# Patient Record
Sex: MALE | Race: OTHER | HISPANIC OR LATINO | URBAN - METROPOLITAN AREA
[De-identification: names, ages, dates, MRNs, and addresses within clinical notes are randomized per-mention and may not be internally consistent; named-entity substitution may affect disease eponyms.]

---

## 2021-11-03 ENCOUNTER — EMERGENCY (EMERGENCY)
Facility: HOSPITAL | Age: 66
LOS: 1 days | Discharge: HOME | End: 2021-11-03
Attending: EMERGENCY MEDICINE | Admitting: EMERGENCY MEDICINE
Payer: MEDICARE

## 2021-11-03 VITALS
TEMPERATURE: 97 F | DIASTOLIC BLOOD PRESSURE: 79 MMHG | RESPIRATION RATE: 16 BRPM | OXYGEN SATURATION: 98 % | HEART RATE: 71 BPM | SYSTOLIC BLOOD PRESSURE: 129 MMHG

## 2021-11-03 LAB
ALBUMIN SERPL ELPH-MCNC: 4.2 G/DL — SIGNIFICANT CHANGE UP (ref 3.3–5)
ALP SERPL-CCNC: 57 U/L — SIGNIFICANT CHANGE UP (ref 40–120)
ALT FLD-CCNC: 24 U/L — SIGNIFICANT CHANGE UP (ref 4–41)
ANION GAP SERPL CALC-SCNC: 10 MMOL/L — SIGNIFICANT CHANGE UP (ref 7–14)
APTT BLD: 28 SEC — SIGNIFICANT CHANGE UP (ref 27–36.3)
AST SERPL-CCNC: 24 U/L — SIGNIFICANT CHANGE UP (ref 4–40)
BILIRUB SERPL-MCNC: 0.7 MG/DL — SIGNIFICANT CHANGE UP (ref 0.2–1.2)
BUN SERPL-MCNC: 20 MG/DL — SIGNIFICANT CHANGE UP (ref 7–23)
CALCIUM SERPL-MCNC: 9.1 MG/DL — SIGNIFICANT CHANGE UP (ref 8.4–10.5)
CHLORIDE SERPL-SCNC: 112 MMOL/L — HIGH (ref 98–107)
CO2 SERPL-SCNC: 29 MMOL/L — SIGNIFICANT CHANGE UP (ref 22–31)
CREAT SERPL-MCNC: 1.09 MG/DL — SIGNIFICANT CHANGE UP (ref 0.5–1.3)
GLUCOSE SERPL-MCNC: 108 MG/DL — HIGH (ref 70–99)
HCT VFR BLD CALC: 38.1 % — LOW (ref 39–50)
HGB BLD-MCNC: 12.7 G/DL — LOW (ref 13–17)
INR BLD: 1.18 RATIO — HIGH (ref 0.88–1.16)
MAGNESIUM SERPL-MCNC: 2.4 MG/DL — SIGNIFICANT CHANGE UP (ref 1.6–2.6)
MCHC RBC-ENTMCNC: 31.8 PG — SIGNIFICANT CHANGE UP (ref 27–34)
MCHC RBC-ENTMCNC: 33.3 GM/DL — SIGNIFICANT CHANGE UP (ref 32–36)
MCV RBC AUTO: 95.3 FL — SIGNIFICANT CHANGE UP (ref 80–100)
NRBC # BLD: 0 /100 WBCS — SIGNIFICANT CHANGE UP
NRBC # FLD: 0 K/UL — SIGNIFICANT CHANGE UP
PHOSPHATE SERPL-MCNC: 2.4 MG/DL — LOW (ref 2.5–4.5)
PLATELET # BLD AUTO: 175 K/UL — SIGNIFICANT CHANGE UP (ref 150–400)
POTASSIUM SERPL-MCNC: 3.6 MMOL/L — SIGNIFICANT CHANGE UP (ref 3.5–5.3)
POTASSIUM SERPL-SCNC: 3.6 MMOL/L — SIGNIFICANT CHANGE UP (ref 3.5–5.3)
PROT SERPL-MCNC: 6.7 G/DL — SIGNIFICANT CHANGE UP (ref 6–8.3)
PROTHROM AB SERPL-ACNC: 13.5 SEC — SIGNIFICANT CHANGE UP (ref 10.6–13.6)
RBC # BLD: 4 M/UL — LOW (ref 4.2–5.8)
RBC # FLD: 13.5 % — SIGNIFICANT CHANGE UP (ref 10.3–14.5)
SODIUM SERPL-SCNC: 151 MMOL/L — HIGH (ref 135–145)
WBC # BLD: 7.45 K/UL — SIGNIFICANT CHANGE UP (ref 3.8–10.5)
WBC # FLD AUTO: 7.45 K/UL — SIGNIFICANT CHANGE UP (ref 3.8–10.5)

## 2021-11-03 PROCEDURE — 99284 EMERGENCY DEPT VISIT MOD MDM: CPT

## 2021-11-03 NOTE — ED ADULT NURSE NOTE - CHIEF COMPLAINT QUOTE
Per pt's daughter pt has been in [facility] since Monday for rectal bleeding, rec'd 2 units of blood.  Pt was prepped for colonoscopy that was scheduled for noon, daughter states that it didn't happen so she signed him out AMA and came to ED.  PMH: HTN, HLD, DM2

## 2021-11-03 NOTE — ED PROVIDER NOTE - PATIENT PORTAL LINK FT
You can access the FollowMyHealth Patient Portal offered by [facility] by registering at the following website: [email or web address]. By joining [employer]’s FollowMyHealth portal, you will also be able to view your health information using other applications (apps) compatible with our system.

## 2021-11-03 NOTE — ED PROVIDER NOTE - NSFOLLOWUPCLINICS_GEN_ALL_ED_FT
[facility] [place]  Gastroenterology  [address] [place] [address]  Phone: [phone]  Fax:

## 2021-11-03 NOTE — ED PROVIDER NOTE - PHYSICAL EXAMINATION
VITALS:   T(C): 36.8 (11-03-21 @ 21:08), Max: 36.8 (11-03-21 @ 21:08)  HR: 74 (11-03-21 @ 21:08) (71 - 74)  BP: 142/70 (11-03-21 @ 21:08) (129/79 - 142/70)  RR: 18 (11-03-21 @ 21:08) (16 - 18)  SpO2: 98% (11-03-21 @ 21:08) (98% - 98%)    PHYSICAL EXAM:   General: NAD; awake, lying in bed comfortably  HEENT: nc/at, clear conjunctiva, moist mucous membranes  Neck: supple, no JVD  Heart: RRR; no murmurs, rubs, or gallops  Chest/Lung: clear to auscultation bilaterally; no wheezes, rales, or rhonchi  Abdomen: soft, non-tender, non-distended; no guarding or rebound; bowel sounds present  Rectal: no signs of bleeding  Extremities: no edema, erythema, or tenderness to palpation  Skin: clear, dry  Neuro: A&Ox3; no focal deficits, grossly intact VITALS:   T(C): 36.8 (11-03-21 @ 21:08), Max: 36.8 (11-03-21 @ 21:08)  HR: 74 (11-03-21 @ 21:08) (71 - 74)  BP: 142/70 (11-03-21 @ 21:08) (129/79 - 142/70)  RR: 18 (11-03-21 @ 21:08) (16 - 18)  SpO2: 98% (11-03-21 @ 21:08) (98% - 98%)    PHYSICAL EXAM:   General: NAD; awake, lying in bed comfortably  HEENT: nc/at, clear conjunctiva, moist mucous membranes  Neck: supple, no JVD  Heart: RRR; no murmurs, rubs, or gallops  Chest/Lung: clear to auscultation bilaterally; no wheezes, rales, or rhonchi  Abdomen: soft, non-tender, non-distended; no guarding or rebound; bowel sounds present  Rectal: no signs of bleeding  Extremities: no edema, erythema, or tenderness to palpation  Skin: clear, dry  Neuro: A&Ox3; no focal deficits, grossly intact  ATTENDING PHYSICAL EXAM  GEN - NAD; well appearing; A+O x3  HEAD - NC/AT; EYES/NOSE - PERRL, EOMI, no pale conj, mucous membranes moist, no discharge; THROAT: Oral cavity and pharynx normal. No inflammation, swelling, exudate, or lesions  NECK: Neck supple, non-tender without lymphadenopathy, no masses, no JVD  PULMONARY - CTA b/l, symmetric breath sounds, no w/r/r  CARDIAC -s1s2, RRR, no M,R,G  ABDOMEN - +NABS, ND, NT, soft, no guarding, no rebound, no masses   BACK - no CVA tenderness, No vertebral or paravertebral tenderness  EXTREMITIES - symmetric pulses, 2+ dp, capillary refill < 2 seconds, no clubbing, no cyanosis, no edema

## 2021-11-03 NOTE — ED PROVIDER NOTE - CLINICAL SUMMARY MEDICAL DECISION MAKING FREE TEXT BOX
Ordered labs - CBC, CMP, coags; type and screen. Possible admission given previous admission to OSH and leaving AMA w/o colonoscopy i/s/o GIB. Ordered labs - CBC, CMP, coags; type and screen. May require admission if anemic or evidence ongoing GIB.

## 2021-11-03 NOTE — ED ADULT TRIAGE NOTE - CHIEF COMPLAINT QUOTE
Per pt's daughter pt has been in [facility] since Monday for rectal bleeding, rec'd 2 units of blood.  Pt was prepped for colonoscopy that was scheduled for noon, daughter states that it didn't happen so she signed him out AMA and came to ED.  PMH: HTN, HLD, DM2

## 2021-11-03 NOTE — ED ADULT NURSE NOTE - NSIMPLEMENTINTERV_GEN_ALL_ED
Implemented All Fall with Harm Risk Interventions:  [place] to call system. Call bell, personal items and telephone within reach. Instruct patient to call for assistance. Room bathroom lighting operational. Non-slip footwear when patient is off stretcher. Physically safe environment: no spills, clutter or unnecessary equipment. Stretcher in lowest position, wheels locked, appropriate side rails in place. Provide visual cue, wrist band, yellow gown, etc. Monitor gait and stability. Monitor for mental status changes and reorient to person, place, and time. Review medications for side effects contributing to fall risk. Reinforce activity limits and safety measures with patient and family. Provide visual clues: red socks.

## 2021-11-03 NOTE — ED PROVIDER NOTE - PROGRESS NOTE DETAILS
FOBT negative. Hemoglobin stable at 12.7. Workup unremarkable. Patient will be discharged with outpatient GI follow-up. FOBT negative. Hemoglobin and hematocrit stable at 12.7 and 38.1, respectively. Workup unremarkable. Patient will be discharged with outpatient GI follow-up.

## 2021-11-03 NOTE — ED PROVIDER NOTE - OBJECTIVE STATEMENT
The patient is a 66 year old male w/ PMHx of HTN, HLD, DM2 p/w rectal bleeding. The patient was admitted to [facility] for rectal bleeding 2 days ago when he was found to have black stool at home. He received 2 units of blood during his hospitalization there, and was prepped for a colonoscopy that was scheduled for noon today. However, when the planned colonoscopy did not occur, the patient's daughter signed the patient out AMA and brought him to this ED. Currently, the patient reports feeling well and has no complaints. He denies any abdominal pain, nausea/vomiting, diarrhea, constipation, chest pain, SOB, fever, headache, dizziness, or weakness. The patient is a 66 year old male w/ PMHx of HTN, HLD, DM2 p/w rectal bleeding. The patient was admitted to [facility] for rectal bleeding 2 days ago when he was found to have black stool at home. He received 2 units of blood during his hospitalization there, and was prepped for a colonoscopy that was scheduled for noon today. However, when the planned colonoscopy did not occur, the patient's daughter signed the patient out AMA and brought him to this ED. Currently, the patient reports feeling well and has no complaints. He denies any abdominal pain, nausea/vomiting, diarrhea, constipation, chest pain, SOB, fever, headache, dizziness, or weakness.  Attending - Agree with above.  I evaluated patient myself. 67 y/o M speaks Macanese, hx with daughter on phone as .  Offered  phone.  Reports on Monday he had episode of dark black stool associated with lightheadedness - lied on floor.  Brought by ambulance to [facility].  Daughter reports that he received 2 units blood transfusion.  Had prep and scheduled to have colonoscopy today per daughter, but when she went there, was not done.  He did not want to stay there so left and brought to [facility] ER for further evaluation.  Patient reports feeling well now.  Denies any abd pain.  No lightheadedness.  Reports no stool besides passing watery feces from bowel prep.  No BRBPR.  No CP/SOB.  No cough, fever, current covid.

## 2021-11-03 NOTE — ED ADULT NURSE NOTE - OBJECTIVE STATEMENT
Patient alert and oriented x4. PMH HTN HLD DM2. Patient recently admitted to [facility] for GIB, patient however left AMA r/t delay in diagnostic exams. Patient's daughter brought patient into ED for further eval.  Labs drawn and send  IVL in place   Assessment ongoing   Results and Dispo pending

## 2021-11-04 VITALS
DIASTOLIC BLOOD PRESSURE: 84 MMHG | RESPIRATION RATE: 16 BRPM | HEART RATE: 69 BPM | SYSTOLIC BLOOD PRESSURE: 134 MMHG | TEMPERATURE: 98 F | OXYGEN SATURATION: 100 %

## 2021-11-04 LAB
BLD GP AB SCN SERPL QL: NEGATIVE — SIGNIFICANT CHANGE UP
OB PNL STL: NEGATIVE — SIGNIFICANT CHANGE UP
RH IG SCN BLD-IMP: POSITIVE — SIGNIFICANT CHANGE UP

## 2021-11-04 NOTE — ED ADULT NURSE REASSESSMENT NOTE - NS ED NURSE REASSESS COMMENT FT1
c/o rectal bleeding. alert and oriented x 4. ambulated. denies abd discomfort and rectal bleeding today. provided oral fluid after MD Hernandez made aware.

## 2022-05-03 NOTE — ED ADULT NURSE NOTE - PRO INTERPRETER NEED 2
Anesthesia Pre Eval Note    Anesthesia ROS/Med Hx    Overall Review:  EKG was reviewed and Echo was reviewed     Anesthetic Complication History:  Patient does not have a history of anesthetic complications      Pulmonary Review:    Positive for COPD     Neuro/Psych Review:    Positive for psychiatric history    Cardiovascular Review:    Positive for CAD  Positive for CABG/stent  Positive for dysrhythmias (Aflutter)  Positive for hypertension  Positive for hyperlipidemia    GI/HEPATIC/RENAL Review:    Positive for renal disease    End/Other Review:    Positive for chronic pain  Additional Results:  EKG:  Encounter Date: 03/20/22  -Electrocardiogram 12-Lead:        Result                      Value                           Ventricular Rate EKG/M*     110                             Atrial Rate (BPM)           250                             QRS-Interval (MSEC)         130                             QT-Interval (MSEC)          362                             QTc                         490                             R Axis (Degrees)            58                              T Axis (Degrees)            44                              REPORT TEXT                                             Atrial flutter   with variable AV block   Right bundle branch block   T wave abnormality, consider lateral ischemia   Abnormal ECG   No previous ECGs available   Confirmed by OMER NGUYEN MD ([id number]) on 3/20/2022 12:21:05 PM    Echo:  Ejection Fraction       Date                     Value               Ref Range           Status                03/22/2022               55%                                     Corrected        ----------   ALLERGIES:  No Known Allergies       Last Labs        Component                Value               Date/Time                  WBC                      6.8                 04/19/2022 1012            RBC                      4.58                04/19/2022 1012            HGB                      12.9                 04/19/2022 1012            HCT                      40.9                04/19/2022 1012            MCV                      89.3                04/19/2022 1012            MCH                      28.2                04/19/2022 1012            MCHC                     31.5 (L)            04/19/2022 1012            RDW-CV                   14.6                04/19/2022 1012            Sodium                   142                 04/19/2022 1012            Potassium                4.3                 04/19/2022 1012            Chloride                 109 (H)             04/19/2022 1012            Carbon Dioxide           25                  04/19/2022 1012            Glucose                  76                  04/19/2022 1012            BUN                      25 (H)              04/19/2022 1012            Creatinine               1.32 (H)            04/19/2022 1012            Glomerular Filtrati*     40 (L)              04/19/2022 1012            Calcium                  9.4                 04/19/2022 1012            PLT                      249                 04/19/2022 1012            INR                      1.1                 03/20/2022 1234        Past Medical History:  No date: Anxiety  No date: Atrial flutter (CMS/[facility])  No date: CAD (coronary artery disease)  No date: CKD (chronic kidney disease)  1/22/2019: Claudication (CMS/[facility])  No date: Claudication (CMS/[facility])  No date: COPD (chronic obstructive pulmonary disease) (CMS/[facility])  No date: Depression  No date: Epistaxis  1/22/2019: Essential hypertension  1/22/2019: Hyperlipidemia  1/22/2019: S/P CABG (coronary artery bypass graft)  1/22/2019: Varicose veins of both lower extremities with pain  No date: Wears dentures      Comment:  upper and lower dentures  No date: Wears glasses    Past Surgical History:  No date: Appendectomy  No date: Colonoscopy  No date: Coronary artery bypass graft       Prior to Admission medications :  Medication  rivaroxaban (Xarelto) 20 MG Tab, Sig Take 20 mg by mouth every evening. , Start Date 10/20/17, End Date , Taking? Yes, Authorizing Provider Outside Provider    Medication Multiple Vitamins-Minerals (vitamin - therapeutic multivitamins w/minerals) tablet, Sig Take 1 tablet by mouth daily., Start Date , End Date , Taking? Yes, Authorizing Provider Outside Provider    Medication lisinopril (ZESTRIL) 10 MG tablet, Sig Take 1 tablet by mouth daily., Start Date 11/16/21, End Date 11/16/22, Taking? Yes, Authorizing Provider Aura Pineda MD    Medication metoPROLOL tartrate (LOPRESSOR) 25 MG tablet, Sig Take 1 tablet by mouth every 12 hours., Start Date 7/24/21, End Date , Taking? Yes, Authorizing Provider Aura Pineda MD    Medication budesonide-formoterol (Symbicort) 160-4.5 MCG/ACT inhaler, Sig Inhale 2 puffs into the lungs 2 times daily., Start Date 7/19/21, End Date 7/19/22, Taking? Yes, Authorizing Provider Aura Pineda MD    Medication atorvastatin (LIPITOR) 40 MG tablet, Sig Take 1 tablet by mouth daily.  Patient taking differently: Take 40 mg by mouth nightly. , Start Date 5/14/21, End Date , Taking? Yes, Authorizing Provider Aura Pineda MD    Medication buPROPion (WELLBUTRIN XL) 150 MG 24 hr tablet, Sig Take 300 mg by mouth daily. , Start Date 5/29/19, End Date , Taking? Yes, Authorizing Provider Deborah Hou,     Medication mirtazapine (REMERON) 15 MG tablet, Sig Take 15 mg by mouth nightly., Start Date , End Date , Taking? Yes, Authorizing Provider Outside Provider    Medication ALPRAZolam (XANAX) 0.25 MG tablet, Sig Take 0.25 mg by mouth 2 times daily as needed for Anxiety. , Start Date , End Date , Taking? Yes, Authorizing Provider Outside Provider         Patient Vitals in the past 24 hrs:  05/03/22 0800, Pulse:78, Resp:(!) 22, SpO2:94 %  05/03/22 0743, BP:(!) 175/85, Temp:37.1 °C (98.8 °F), Temp src:Temporal, Pulse:78, Resp:(!) 22, SpO2:93 %, Height:5' 4\" (1.626 m), Weight:72.5 kg (159 lb 13.3  oz)      Relevant Problems   Anesthesia Problems (within normal limits)       Physical Exam     Airway   Mallampati: II  TM Distance: >3 FB  Neck ROM: Full    Head Assessment  Head assessment: Normocephalic and Atraumatic    General Assessment  General Assessment: Alert and oriented and No acute distress    Dental Exam    Patient has:  Upper dentures, Lower dentures and Edentulous      Anesthesia Plan:    ASA Status: 3  Anesthesia Type: General    Induction: Intravenous  Preferred Airway Type: ETT  Maintenance: Inhalational    Post-op Pain Management: Per Surgeon      Checklist  Reviewed: NPO Status, Allergies, Medications, Problem list and Past Med History  Consent/Risks Discussed Statement:  The proposed anesthetic plan, including its risks and benefits, have been discussed with the Patient along with the risks and benefits of alternatives. Questions were encouraged and answered and the patient and/or representative understands and agrees to proceed.        I discussed with the patient (and/or patient's legal representative) the risks and benefits of the proposed anesthesia plan, General, which may include services performed by other anesthesia providers.    Alternative anesthesia plans, if available, were reviewed with the patient (and/or patient's legal representative). Discussion has been held with the patient (and/or patient's legal representative) regarding risks of anesthesia, which include Nausea, Vomiting and Dental Injury and emergent situations that may require change in anesthesia plan.    The patient (and/or patient's legal representative) has indicated understanding, his/her questions have been answered, and he/she wishes to proceed with the planned anesthetic.    Blood Products: Not Anticipated     [place]

## 2023-05-27 NOTE — ED PROVIDER NOTE - CROS ED NEURO ALL NEG
[address] ENCOUNTER      Pt Name: Yuliya Taveras  MRN: [id number]  [place] 1983  Date of evaluation: 5/27/2023  Provider: Ml Weldon MD             NURSING NOTES REVIEWED      CURRENT MEDICATIONS       Discharge Medication List as of 5/27/2023  5:11 AM        CONTINUE these medications which have NOT CHANGED    Details   aspirin EC 81 MG EC tablet Take 1 tablet by mouth daily, Disp-30 tablet, R-5Print      methadone 10 MG/5ML solution 42.5 mLs. Historical Med             ALLERGIES     Ceclor [cefaclor]    FAMILY HISTORY     History reviewed. No pertinent family history. SOCIAL HISTORY       Social History     Socioeconomic History    Marital status:      Spouse name: None    Number of children: None    Years of education: None    Highest education level: None   Tobacco Use    Smoking status: Every Day     Packs/day: 1.00     Years: 25.00     Pack years: 25.00     Types: Cigarettes     Start date: 1994    Smokeless tobacco: Never   Vaping Use    Vaping Use: Never used   Substance and Sexual Activity    Alcohol use: No    Drug use: Yes     Types: Opiates , IV     Comment: (-) x6 months heroin for about 20 yrs            Bertin Coma Scale  Eye Opening: Spontaneous  Best Verbal Response: Oriented  Best Motor Response: Obeys commands  Bertin Coma Scale Score: 15                 ED Triage Vitals   BP Temp Temp Source Pulse Respirations SpO2 Height Weight - Scale   05/27/23 0017 05/27/23 0017 05/27/23 0017 05/27/23 0017 05/27/23 0017 05/27/23 0017 05/27/23 0023 05/27/23 0023   (!) 124/91 98.4 °F (36.9 °C) Oral 90 18 97 % 5' 6\" (1.676 m) 180 lb (81.6 kg)           MEDICAL DECISION MAKING AND DIFFERENTIAL DIAGNOSES      Medical Decision Making  Amount and/or Complexity of Data Reviewed  Radiology: ordered. Risk  Prescription drug management.              Number and Complexity of Problems    Chief Complaint:   Chief Complaint negative...

## 2025-02-13 NOTE — ED ADULT NURSE REASSESSMENT NOTE - PATIENT ON (OXYGEN DELIVERY METHOD)
Patient is in the supine position.   The body was positioned using the following devices: blanket.  Procedure performed on a bed/cart.Semi fowlers
The site was marked. Prepped: right chest. Prepped with: ChloraPrep. The patient was draped.
room air

## 2025-02-20 ENCOUNTER — APPOINTMENT (OUTPATIENT)
Dept: CARE COORDINATION | Facility: HOME HEALTH | Age: 70
End: 2025-02-20

## 2025-02-20 PROBLEM — Z00.00 ENCOUNTER FOR PREVENTIVE HEALTH EXAMINATION: Status: ACTIVE | Noted: 2025-02-20
